# Patient Record
Sex: MALE | Race: WHITE | Employment: FULL TIME | URBAN - METROPOLITAN AREA
[De-identification: names, ages, dates, MRNs, and addresses within clinical notes are randomized per-mention and may not be internally consistent; named-entity substitution may affect disease eponyms.]

---

## 2018-03-20 ENCOUNTER — APPOINTMENT (OUTPATIENT)
Dept: GENERAL RADIOLOGY | Age: 58
End: 2018-03-20
Payer: COMMERCIAL

## 2018-03-20 ENCOUNTER — HOSPITAL ENCOUNTER (EMERGENCY)
Age: 58
Discharge: HOME | End: 2018-03-20
Attending: EMERGENCY MEDICINE
Payer: COMMERCIAL

## 2018-03-20 VITALS
HEART RATE: 114 BPM | TEMPERATURE: 98.9 F | HEIGHT: 75 IN | RESPIRATION RATE: 16 BRPM | OXYGEN SATURATION: 96 % | SYSTOLIC BLOOD PRESSURE: 149 MMHG | BODY MASS INDEX: 24.25 KG/M2 | DIASTOLIC BLOOD PRESSURE: 77 MMHG | WEIGHT: 195 LBS

## 2018-03-20 DIAGNOSIS — K59.1 FUNCTIONAL DIARRHEA: Primary | ICD-10-CM

## 2018-03-20 DIAGNOSIS — R50.81 FEVER IN OTHER DISEASES: ICD-10-CM

## 2018-03-20 LAB
INFLUENZA A BY PCR: NOT DETECTED
INFLUENZA B BY PCR: NOT DETECTED

## 2018-03-20 PROCEDURE — 6370000000 HC RX 637 (ALT 250 FOR IP): Performed by: EMERGENCY MEDICINE

## 2018-03-20 PROCEDURE — 99284 EMERGENCY DEPT VISIT MOD MDM: CPT

## 2018-03-20 PROCEDURE — 71046 X-RAY EXAM CHEST 2 VIEWS: CPT

## 2018-03-20 PROCEDURE — 87502 INFLUENZA DNA AMP PROBE: CPT

## 2018-03-20 RX ORDER — IBUPROFEN 800 MG/1
800 TABLET ORAL ONCE
Status: COMPLETED | OUTPATIENT
Start: 2018-03-20 | End: 2018-03-20

## 2018-03-20 RX ADMIN — IBUPROFEN 800 MG: 800 TABLET, FILM COATED ORAL at 04:13

## 2018-03-20 ASSESSMENT — PAIN SCALES - GENERAL
PAINLEVEL_OUTOF10: 3
PAINLEVEL_OUTOF10: 3

## 2018-03-20 ASSESSMENT — PAIN DESCRIPTION - PAIN TYPE: TYPE: ACUTE PAIN

## 2018-03-20 ASSESSMENT — PAIN DESCRIPTION - LOCATION: LOCATION: HEAD

## 2018-03-20 NOTE — ED PROVIDER NOTES
Distension:  None. Tenderness: No abdominal tenderness. Liver: non-tender. Spleen:  non-palpable and non-tender. Pulsatile Mass: absent. Hernia:  no inguinal or femoral hernias noted. Integument:  Normal turgor. Warm, dry, without visible rash, unless noted elsewhere. Lymphatic: no lymphadenopathy noted  Neurological:  Oriented. Motor functions intact. Lab / Imaging Results   (All laboratory and radiology results have been personally reviewed by myself)  Labs:  Results for orders placed or performed during the hospital encounter of 03/20/18   Rapid influenza A/B antigens   Result Value Ref Range    Influenza A by PCR Not Detected Not Detected    Influenza B by PCR Not Detected Not Detected     Imaging: All Radiology results interpreted by Radiologist unless otherwise noted. XR CHEST STANDARD (2 VW)    (Results Pending)     ED Course / Medical Decision Making     Medications   ibuprofen (ADVIL;MOTRIN) tablet 800 mg (800 mg Oral Given 3/20/18 0413)     ED Course      Re-examination:  3/20/18             MDM:     Patient presents with diarrhea fever or sinus congestion and postnasal drip. He is well-appearing nontoxic afebrile here. Mild frontal sinus tenderness he started taking a decongestant with improvement yesterday. Lungs are clear pulse ox is normal no abdominal tenderness guarding rebound rigidity chest x-ray and influenza are negative. Chest x-ray interpreted by myself without radiology confirmation to be nonacute. Patient with constellation of findings consistent with sinus congestion diarrhea fever and body aches. I do not believe he has a true sinusitis. Continue taking the Mucinex Tylenol Motrin for fever close PCP follow-up and discuss reasons for ED return sooner  Counseling:     The emergency provider has spoken with the patient and discussed todays results, in addition to providing specific details for the plan of care and counseling

## 2019-04-02 ENCOUNTER — HOSPITAL ENCOUNTER (OUTPATIENT)
Age: 59
Discharge: HOME | End: 2019-04-04
Payer: COMMERCIAL

## 2019-04-02 LAB — PROSTATE SPECIFIC ANTIGEN: 1.91 NG/ML (ref 0–4)

## 2019-04-02 PROCEDURE — G0103 PSA SCREENING: HCPCS

## 2020-01-08 ENCOUNTER — HOSPITAL ENCOUNTER (OUTPATIENT)
Age: 60
Discharge: HOME | End: 2020-01-10

## 2020-01-08 PROCEDURE — 88305 TISSUE EXAM BY PATHOLOGIST: CPT

## 2020-01-09 ENCOUNTER — HOSPITAL ENCOUNTER (OUTPATIENT)
Age: 60
Discharge: HOME | End: 2020-01-11

## 2020-01-09 LAB
ANION GAP SERPL CALCULATED.3IONS-SCNC: 12 MMOL/L (ref 7–16)
BUN BLDV-MCNC: 8 MG/DL (ref 8–23)
CALCIUM SERPL-MCNC: 8.6 MG/DL (ref 8.6–10.2)
CHLORIDE BLD-SCNC: 103 MMOL/L (ref 98–107)
CO2: 22 MMOL/L (ref 22–29)
CREAT SERPL-MCNC: 0.9 MG/DL (ref 0.7–1.2)
GFR AFRICAN AMERICAN: >60
GFR NON-AFRICAN AMERICAN: >60 ML/MIN/1.73
GLUCOSE BLD-MCNC: 100 MG/DL (ref 74–99)
HCT VFR BLD CALC: 41.3 % (ref 37–54)
HEMOGLOBIN: 13.4 G/DL (ref 12.5–16.5)
POTASSIUM SERPL-SCNC: 3.6 MMOL/L (ref 3.5–5)
SODIUM BLD-SCNC: 137 MMOL/L (ref 132–146)

## 2020-01-09 PROCEDURE — 85018 HEMOGLOBIN: CPT

## 2020-01-09 PROCEDURE — 85014 HEMATOCRIT: CPT

## 2020-01-09 PROCEDURE — 80048 BASIC METABOLIC PNL TOTAL CA: CPT

## 2020-05-26 ENCOUNTER — HOSPITAL ENCOUNTER (OUTPATIENT)
Age: 60
Discharge: HOME | End: 2020-05-28
Payer: COMMERCIAL

## 2020-05-26 LAB — PROSTATE SPECIFIC ANTIGEN: 0.9 NG/ML (ref 0–4)

## 2020-05-26 PROCEDURE — 84153 ASSAY OF PSA TOTAL: CPT

## 2020-09-01 ENCOUNTER — HOSPITAL ENCOUNTER (OUTPATIENT)
Age: 60
Discharge: HOME | End: 2020-09-03
Payer: COMMERCIAL

## 2020-09-01 LAB — PROSTATE SPECIFIC ANTIGEN: 1.04 NG/ML (ref 0–4)

## 2020-09-01 PROCEDURE — 84153 ASSAY OF PSA TOTAL: CPT

## 2022-01-17 ENCOUNTER — HOSPITAL ENCOUNTER (OUTPATIENT)
Age: 62
Discharge: HOME | End: 2022-01-19

## 2022-01-17 PROCEDURE — 88305 TISSUE EXAM BY PATHOLOGIST: CPT

## 2023-05-16 ENCOUNTER — HOSPITAL ENCOUNTER (OUTPATIENT)
Age: 63
Discharge: HOME | End: 2023-05-18
Payer: COMMERCIAL

## 2023-05-16 ENCOUNTER — HOSPITAL ENCOUNTER (OUTPATIENT)
Dept: GENERAL RADIOLOGY | Age: 63
Discharge: HOME | End: 2023-05-18
Payer: COMMERCIAL

## 2023-05-16 DIAGNOSIS — M25.561 RIGHT KNEE PAIN, UNSPECIFIED CHRONICITY: ICD-10-CM

## 2023-05-16 DIAGNOSIS — M25.562 LEFT KNEE PAIN, UNSPECIFIED CHRONICITY: ICD-10-CM

## 2023-05-16 PROCEDURE — 73564 X-RAY EXAM KNEE 4 OR MORE: CPT

## 2023-07-14 ENCOUNTER — OFFICE VISIT (OUTPATIENT)
Dept: NEUROSURGERY | Age: 63
End: 2023-07-14
Payer: COMMERCIAL

## 2023-07-14 VITALS
OXYGEN SATURATION: 95 % | BODY MASS INDEX: 23.62 KG/M2 | HEIGHT: 75 IN | HEART RATE: 103 BPM | SYSTOLIC BLOOD PRESSURE: 130 MMHG | DIASTOLIC BLOOD PRESSURE: 85 MMHG | WEIGHT: 190 LBS

## 2023-07-14 DIAGNOSIS — M62.81 MUSCLE WEAKNESS: Primary | ICD-10-CM

## 2023-07-14 PROCEDURE — 99204 OFFICE O/P NEW MOD 45 MIN: CPT | Performed by: NEUROLOGICAL SURGERY

## 2023-07-14 PROCEDURE — 99202 OFFICE O/P NEW SF 15 MIN: CPT | Performed by: NEUROLOGICAL SURGERY

## 2023-07-14 RX ORDER — BUPROPION HYDROCHLORIDE 300 MG/1
TABLET ORAL
COMMUNITY
Start: 2023-06-02

## 2023-07-14 RX ORDER — FLUOXETINE HYDROCHLORIDE 20 MG/1
CAPSULE ORAL
COMMUNITY
Start: 2023-06-13

## 2023-07-14 ASSESSMENT — ENCOUNTER SYMPTOMS
EYES NEGATIVE: 1
BACK PAIN: 1
ALLERGIC/IMMUNOLOGIC NEGATIVE: 1
GASTROINTESTINAL NEGATIVE: 1
RESPIRATORY NEGATIVE: 1

## 2023-07-14 NOTE — PROGRESS NOTES
Mouth/Throat:      Mouth: Mucous membranes are moist.      Pharynx: Oropharynx is clear. No oropharyngeal exudate. Eyes:      General: No visual field deficit or scleral icterus. Right eye: No discharge. Left eye: No discharge. Conjunctiva/sclera: Conjunctivae normal.      Pupils: Pupils are equal, round, and reactive to light. Pulmonary:      Effort: Pulmonary effort is normal.   Abdominal:      General: Abdomen is flat. There is no distension. Musculoskeletal:         General: No swelling, tenderness, deformity or signs of injury. Normal range of motion. Right lower leg: No edema. Left lower leg: No edema. Skin:     General: Skin is warm and dry. Capillary Refill: Capillary refill takes less than 2 seconds. Coloration: Skin is not jaundiced or pale. Findings: No bruising, erythema, lesion or rash. Neurological:      General: No focal deficit present. Mental Status: He is alert and oriented to person, place, and time. Mental status is at baseline. GCS: GCS eye subscore is 4. GCS verbal subscore is 5. GCS motor subscore is 6. Cranial Nerves: No cranial nerve deficit, dysarthria or facial asymmetry. Sensory: Sensation is intact. No sensory deficit. Motor: Motor function is intact. No weakness, tremor, atrophy, abnormal muscle tone, seizure activity or pronator drift. Coordination: Romberg sign negative. Coordination normal. Finger-Nose-Finger Test and Heel to [facility] Test normal.      Gait: Gait normal.      Deep Tendon Reflexes: Reflexes normal. Babinski sign absent on the right side. Babinski sign absent on the left side. Reflex Scores:       Tricep reflexes are 2+ on the right side and 2+ on the left side. Bicep reflexes are 2+ on the right side and 2+ on the left side. Brachioradialis reflexes are 2+ on the right side and 2+ on the left side.        Patellar reflexes are 2+ on the right side and 2+ on the left

## 2023-08-01 ENCOUNTER — HOSPITAL ENCOUNTER (OUTPATIENT)
Dept: NEUROLOGY | Age: 63
Discharge: HOME | End: 2023-08-01
Payer: COMMERCIAL

## 2023-08-01 VITALS — BODY MASS INDEX: 23.62 KG/M2 | WEIGHT: 190 LBS | HEIGHT: 75 IN

## 2023-08-01 PROCEDURE — 95913 NRV CNDJ TEST 13/> STUDIES: CPT | Performed by: PHYSICAL MEDICINE & REHABILITATION

## 2023-08-01 PROCEDURE — 95885 MUSC TST DONE W/NERV TST LIM: CPT

## 2023-08-01 PROCEDURE — 95913 NRV CNDJ TEST 13/> STUDIES: CPT

## 2023-08-01 PROCEDURE — 95885 MUSC TST DONE W/NERV TST LIM: CPT | Performed by: PHYSICAL MEDICINE & REHABILITATION

## 2023-08-01 NOTE — PROCEDURES
bilaterally (cutoff for normal is a combined latency prolongation of 0.9 ms). This test increases sensitivity of detection of median nerve mononeuropathy. Motor nerve conduction studies of the bilateral median, ulnar, peroneal, and tibial nerves showed normal distal latencies, normal CMAP amplitudes, and normal conduction velocities. F-wave studies of the bilateral median and ulnar nerves revealed normal latencies. F-wave studies of the bilateral common peroneal and tibial nerves revealed delayed latencies. Bilateral tibial nerve H-reflex responses were normal.      Needle EMG:   Needle EMG was performed using a monopolar needle. All muscles tested, as listed in the table above, demonstrated normal amplitude, duration, phases, and recruitment. There was no evidence of active denervation. Needle exam was very limited / incomplete due to patient severe anxiety. Patient requested to pause test during needle exam and test was not resumed. Diagnostic Interpretation: This study was incomplete due to incomplete needle exam.     There is no definitive electrodiagnostic evidence of any peripheral nerve mononeuropathy, plexopathy, or peripheral polyneuropathy in the upper or lower extremities. Cannot evaluate for cervical or lumbar motor radiculopathy without completion of more extensive needle exam. Prolonged lower extremity F-waves were noted, which is a non-specific finding; possible causes may include an early polyneuropathy, lumbar radiculopathies, or may be a technical finding related to patient's height. Lower extremity needle exam was not completed to further define due to patient's severe anxiety during exam. Clinical correlation is advised. Previous Study: There is no prior study for comparison    Follow up EMG can be completed if clinically indicated.      Technologist: Mirella Broderick    Physician:  Jayne Schmidt MD  Physical Medicine and Rehabilitation     Nerve conduction studies and

## 2023-12-07 ENCOUNTER — HOSPITAL ENCOUNTER (OUTPATIENT)
Dept: ULTRASOUND IMAGING | Age: 63
Discharge: HOME | End: 2023-12-09
Payer: MEDICARE

## 2023-12-07 DIAGNOSIS — Z13.6 ENCOUNTER FOR SCREENING FOR CARDIOVASCULAR DISORDERS: ICD-10-CM

## 2023-12-07 PROCEDURE — 76706 US ABDL AORTA SCREEN AAA: CPT

## 2024-10-05 ENCOUNTER — HOSPITAL ENCOUNTER (EMERGENCY)
Age: 64
Discharge: HOME | End: 2024-10-05
Attending: EMERGENCY MEDICINE
Payer: MEDICARE

## 2024-10-05 ENCOUNTER — APPOINTMENT (OUTPATIENT)
Dept: GENERAL RADIOLOGY | Age: 64
End: 2024-10-05
Payer: MEDICARE

## 2024-10-05 ENCOUNTER — APPOINTMENT (OUTPATIENT)
Dept: CT IMAGING | Age: 64
End: 2024-10-05
Payer: MEDICARE

## 2024-10-05 VITALS
DIASTOLIC BLOOD PRESSURE: 80 MMHG | BODY MASS INDEX: 23.62 KG/M2 | OXYGEN SATURATION: 91 % | TEMPERATURE: 99 F | SYSTOLIC BLOOD PRESSURE: 124 MMHG | WEIGHT: 190 LBS | HEART RATE: 111 BPM | RESPIRATION RATE: 18 BRPM | HEIGHT: 75 IN

## 2024-10-05 DIAGNOSIS — U07.1 COVID-19: Primary | ICD-10-CM

## 2024-10-05 DIAGNOSIS — R42 LIGHTHEADEDNESS: ICD-10-CM

## 2024-10-05 DIAGNOSIS — J18.9 PNEUMONIA DUE TO INFECTIOUS ORGANISM, UNSPECIFIED LATERALITY, UNSPECIFIED PART OF LUNG: ICD-10-CM

## 2024-10-05 LAB
ALBUMIN SERPL-MCNC: 4.4 G/DL (ref 3.5–5.2)
ALP SERPL-CCNC: 109 U/L (ref 40–129)
ALT SERPL-CCNC: 29 U/L (ref 0–40)
ANION GAP SERPL CALCULATED.3IONS-SCNC: 10 MMOL/L (ref 7–16)
AST SERPL-CCNC: 29 U/L (ref 0–39)
BASOPHILS # BLD: 0 K/UL (ref 0–0.2)
BASOPHILS NFR BLD: 0 % (ref 0–2)
BILIRUB SERPL-MCNC: 0.3 MG/DL (ref 0–1.2)
BNP SERPL-MCNC: <36 PG/ML (ref 0–125)
BUN SERPL-MCNC: 12 MG/DL (ref 6–23)
CALCIUM SERPL-MCNC: 8.9 MG/DL (ref 8.6–10.2)
CHLORIDE SERPL-SCNC: 102 MMOL/L (ref 98–107)
CO2 SERPL-SCNC: 21 MMOL/L (ref 22–29)
CREAT SERPL-MCNC: 0.9 MG/DL (ref 0.7–1.2)
EOSINOPHIL # BLD: 0.13 K/UL (ref 0.05–0.5)
EOSINOPHILS RELATIVE PERCENT: 2 % (ref 0–6)
ERYTHROCYTE [DISTWIDTH] IN BLOOD BY AUTOMATED COUNT: 12.8 % (ref 11.5–15)
GFR, ESTIMATED: >90 ML/MIN/1.73M2
GLUCOSE SERPL-MCNC: 121 MG/DL (ref 74–99)
HCT VFR BLD AUTO: 47.1 % (ref 37–54)
HGB BLD-MCNC: 16 G/DL (ref 12.5–16.5)
LYMPHOCYTES NFR BLD: 0.44 K/UL (ref 1.5–4)
LYMPHOCYTES RELATIVE PERCENT: 6 % (ref 20–42)
MCH RBC QN AUTO: 33.1 PG (ref 26–35)
MCHC RBC AUTO-ENTMCNC: 34 G/DL (ref 32–34.5)
MCV RBC AUTO: 97.5 FL (ref 80–99.9)
MONOCYTES NFR BLD: 1.71 K/UL (ref 0.1–0.95)
MONOCYTES NFR BLD: 24 % (ref 2–12)
NEUTROPHILS NFR BLD: 68 % (ref 43–80)
NEUTS SEG NFR BLD: 4.95 K/UL (ref 1.8–7.3)
PLATELET # BLD AUTO: 245 K/UL (ref 130–450)
PMV BLD AUTO: 8.4 FL (ref 7–12)
POTASSIUM SERPL-SCNC: 4 MMOL/L (ref 3.5–5)
PROMYELOCYTES ABSOLUTE COUNT: 0.06 K/UL
PROMYELOCYTES: 1 %
PROT SERPL-MCNC: 7 G/DL (ref 6.4–8.3)
RBC # BLD AUTO: 4.83 M/UL (ref 3.8–5.8)
RBC # BLD: NORMAL 10*6/UL
SODIUM SERPL-SCNC: 133 MMOL/L (ref 132–146)
TROPONIN I SERPL HS-MCNC: 11 NG/L (ref 0–11)
TROPONIN I SERPL HS-MCNC: 12 NG/L (ref 0–11)
WBC OTHER # BLD: 7.3 K/UL (ref 4.5–11.5)

## 2024-10-05 PROCEDURE — 84484 ASSAY OF TROPONIN QUANT: CPT

## 2024-10-05 PROCEDURE — 96374 THER/PROPH/DIAG INJ IV PUSH: CPT

## 2024-10-05 PROCEDURE — 71275 CT ANGIOGRAPHY CHEST: CPT

## 2024-10-05 PROCEDURE — 93005 ELECTROCARDIOGRAM TRACING: CPT | Performed by: STUDENT IN AN ORGANIZED HEALTH CARE EDUCATION/TRAINING PROGRAM

## 2024-10-05 PROCEDURE — 94640 AIRWAY INHALATION TREATMENT: CPT

## 2024-10-05 PROCEDURE — 6360000002 HC RX W HCPCS: Performed by: EMERGENCY MEDICINE

## 2024-10-05 PROCEDURE — 94664 DEMO&/EVAL PT USE INHALER: CPT

## 2024-10-05 PROCEDURE — 99285 EMERGENCY DEPT VISIT HI MDM: CPT

## 2024-10-05 PROCEDURE — 85025 COMPLETE CBC W/AUTO DIFF WBC: CPT

## 2024-10-05 PROCEDURE — 80053 COMPREHEN METABOLIC PANEL: CPT

## 2024-10-05 PROCEDURE — 6370000000 HC RX 637 (ALT 250 FOR IP): Performed by: EMERGENCY MEDICINE

## 2024-10-05 PROCEDURE — 6360000004 HC RX CONTRAST MEDICATION: Performed by: RADIOLOGY

## 2024-10-05 PROCEDURE — 83880 ASSAY OF NATRIURETIC PEPTIDE: CPT

## 2024-10-05 RX ORDER — DOXYCYCLINE HYCLATE 100 MG
100 TABLET ORAL 2 TIMES DAILY
Qty: 20 TABLET | Refills: 0 | Status: SHIPPED | OUTPATIENT
Start: 2024-10-05 | End: 2024-10-15

## 2024-10-05 RX ORDER — CEFDINIR 300 MG/1
300 CAPSULE ORAL ONCE
Status: COMPLETED | OUTPATIENT
Start: 2024-10-05 | End: 2024-10-05

## 2024-10-05 RX ORDER — DOXYCYCLINE 100 MG/1
100 CAPSULE ORAL ONCE
Status: COMPLETED | OUTPATIENT
Start: 2024-10-05 | End: 2024-10-05

## 2024-10-05 RX ORDER — PREDNISONE 50 MG/1
50 TABLET ORAL DAILY
Qty: 5 TABLET | Refills: 0 | Status: SHIPPED | OUTPATIENT
Start: 2024-10-05 | End: 2024-10-10

## 2024-10-05 RX ORDER — ALBUTEROL SULFATE 90 UG/1
2 INHALANT RESPIRATORY (INHALATION) 4 TIMES DAILY PRN
Qty: 18 G | Refills: 0 | Status: SHIPPED | OUTPATIENT
Start: 2024-10-05

## 2024-10-05 RX ORDER — IOPAMIDOL 755 MG/ML
75 INJECTION, SOLUTION INTRAVASCULAR
Status: COMPLETED | OUTPATIENT
Start: 2024-10-05 | End: 2024-10-05

## 2024-10-05 RX ORDER — IPRATROPIUM BROMIDE AND ALBUTEROL SULFATE 2.5; .5 MG/3ML; MG/3ML
1 SOLUTION RESPIRATORY (INHALATION)
Status: COMPLETED | OUTPATIENT
Start: 2024-10-05 | End: 2024-10-05

## 2024-10-05 RX ORDER — DEXAMETHASONE SODIUM PHOSPHATE 10 MG/ML
10 INJECTION INTRAMUSCULAR; INTRAVENOUS ONCE
Status: COMPLETED | OUTPATIENT
Start: 2024-10-05 | End: 2024-10-05

## 2024-10-05 RX ORDER — CEFDINIR 300 MG/1
300 CAPSULE ORAL 2 TIMES DAILY
Qty: 20 CAPSULE | Refills: 0 | Status: SHIPPED | OUTPATIENT
Start: 2024-10-05 | End: 2024-10-15

## 2024-10-05 RX ADMIN — IPRATROPIUM BROMIDE AND ALBUTEROL SULFATE 1 DOSE: 2.5; .5 SOLUTION RESPIRATORY (INHALATION) at 11:20

## 2024-10-05 RX ADMIN — IPRATROPIUM BROMIDE AND ALBUTEROL SULFATE 1 DOSE: 2.5; .5 SOLUTION RESPIRATORY (INHALATION) at 11:10

## 2024-10-05 RX ADMIN — IPRATROPIUM BROMIDE AND ALBUTEROL SULFATE 1 DOSE: 2.5; .5 SOLUTION RESPIRATORY (INHALATION) at 11:09

## 2024-10-05 RX ADMIN — CEFDINIR 300 MG: 300 CAPSULE ORAL at 12:45

## 2024-10-05 RX ADMIN — DEXAMETHASONE SODIUM PHOSPHATE 10 MG: 10 INJECTION INTRAMUSCULAR; INTRAVENOUS at 11:57

## 2024-10-05 RX ADMIN — IOPAMIDOL 75 ML: 755 INJECTION, SOLUTION INTRAVENOUS at 10:45

## 2024-10-05 RX ADMIN — DOXYCYCLINE HYCLATE 100 MG: 100 CAPSULE ORAL at 12:45

## 2024-10-05 ASSESSMENT — PAIN SCALES - GENERAL: PAINLEVEL_OUTOF10: 4

## 2024-10-05 ASSESSMENT — PAIN DESCRIPTION - LOCATION: LOCATION: HEAD

## 2024-10-05 ASSESSMENT — LIFESTYLE VARIABLES
HOW MANY STANDARD DRINKS CONTAINING ALCOHOL DO YOU HAVE ON A TYPICAL DAY: PATIENT DOES NOT DRINK
HOW OFTEN DO YOU HAVE A DRINK CONTAINING ALCOHOL: NEVER

## 2024-10-05 ASSESSMENT — PAIN - FUNCTIONAL ASSESSMENT: PAIN_FUNCTIONAL_ASSESSMENT: 0-10

## 2024-10-05 NOTE — ED PROVIDER NOTES
Name: Attila Mcdonald    MRN: [id number]     Date / Time Roomed:  10/5/2024  9:12 AM  ED Bed Assignment:  12/12    ------------------ History of Present Illness --------------------  10/5/24, Time: 9:21 AM EDT   Chief Complaint   Patient presents with    Shortness of Breath      HPI    Attila Mcdonald is a 64 y.o. male, with hx of hyperlipidemia, autonomic dysfunction, who presents to the ED today for cough, congestion, lightheadedness, near syncope this morning.  He states he has felt under the weather with cough and congestion over the past 3 days, he states his wife had COVID earlier this week, he states he had a COVID test this morning which was positive.  He states while getting his test he became lightheaded and fell like he was going to pass out.  He does admit that he has had some decreased appetite and probably not drinking enough over the past several days.  He states he also recently traveled to [place] last week which was an 8-hour drive each way.  He denies any leg swelling or pain.  Denies any history of blood clots.  He is not on blood thinners.  He denies any chest pain or abdominal pain.  He did have some nausea this morning however no vomiting.  He does have history of anxiety.  He also relates history of autonomic dysfunction and has seen [facility] for this.  The pt denies other ROS at this time.     PCP: Brian Mak MD.    -------------------- [facility] --------------------    Past Medical History:   has a past medical history of Hyperlipidemia.     Surgical History:  Past Surgical History:   Procedure Laterality Date    COLONOSCOPY         Social History:  Social History     Tobacco Use    Smoking status: Never    Smokeless tobacco: Never   Substance Use Topics    Alcohol use: Yes     Comment: 4 beers a day    Drug use: No       Family History:  Family History   Problem Relation Age of Onset    Other Father         alzheimer's       Allergies:  Patient has no known allergies.    The    Musculoskeletal:         General: No tenderness or deformity. Normal range of motion.      Cervical back: Normal range of motion. No rigidity or tenderness.      Right lower leg: No tenderness. No edema.      Left lower leg: No tenderness. No edema.   Skin:     General: Skin is warm and dry.      Capillary Refill: Capillary refill takes less than 2 seconds.      Coloration: Skin is not jaundiced or pale.      Findings: No bruising, erythema, lesion or rash.   Neurological:      General: No focal deficit present.      Mental Status: He is alert and oriented to person, place, and time.      Cranial Nerves: No cranial nerve deficit.      Motor: No weakness.   Psychiatric:         Mood and Affect: Mood is anxious.         Behavior: Behavior normal.          --------------- External Imaging -------------    -------------------- Procedures --------------------    -------------------- MDM --------------------    /80   Pulse (!) 111   Temp 99 °F (37.2 °C) (Oral)   Resp 18   Ht 1.905 m (6' 3\")   Wt 86.2 kg (190 lb)   SpO2 91%   BMI 23.75 kg/m²       Diagnoses considered, but not limited to, include anxiety, dehydration, electrolyte arrangement, pulmonary emboli, ACS, arrhythmia, to name a few...     Labwork ordered and interpretation by myself. Details below.   Imaging ordered and interpretations by myself and radiologist. Details below.    Labs Reviewed   CBC WITH AUTO DIFFERENTIAL - Abnormal; Notable for the following components:       Result Value    Lymphocytes % 6 (*)     Monocytes % 24 (*)     Promyelocytes 1 (*)     Lymphocytes Absolute 0.44 (*)     Monocytes Absolute 1.71 (*)     Promyelocytes Absolute 0.06 (*)     All other components within normal limits   COMPREHENSIVE METABOLIC PANEL W/ REFLEX TO MG FOR LOW K - Abnormal; Notable for the following components:    CO2 21 (*)     Glucose 121 (*)     All other components within normal limits   TROPONIN - Abnormal; Notable for the following components:

## 2024-10-05 NOTE — DISCHARGE INSTRUCTIONS
Call family doctor tomorrow and schedule a followup appointment to be seen next week.     Please stay well-hydrated.  Tylenol or Motrin for any fever or discomfort, please follow directions on bottle.    Have your doctor obtain  finalized report of CT scan today    CTA PULMONARY W CONTRAST   Final Result   1. There is no pulmonary embolus   2. Small patchy opacity within the superior segment right lower lobe which   could represent pneumonia, viral pneumonia or and or possibly atelectasis.   3. The left lung is clear..

## 2024-10-08 LAB
EKG ATRIAL RATE: 91 BPM
EKG P AXIS: 55 DEGREES
EKG P-R INTERVAL: 170 MS
EKG Q-T INTERVAL: 362 MS
EKG QRS DURATION: 94 MS
EKG QTC CALCULATION (BAZETT): 445 MS
EKG R AXIS: -69 DEGREES
EKG T AXIS: 77 DEGREES
EKG VENTRICULAR RATE: 91 BPM

## 2024-10-08 PROCEDURE — 93010 ELECTROCARDIOGRAM REPORT: CPT | Performed by: INTERNAL MEDICINE

## 2024-10-18 ENCOUNTER — HOSPITAL ENCOUNTER (OUTPATIENT)
Dept: GENERAL RADIOLOGY | Age: 64
Discharge: HOME | End: 2024-10-20
Attending: FAMILY MEDICINE
Payer: MEDICARE

## 2024-10-18 ENCOUNTER — HOSPITAL ENCOUNTER (OUTPATIENT)
Age: 64
Discharge: HOME | End: 2024-10-20
Payer: MEDICARE

## 2024-10-18 DIAGNOSIS — J15.9 UNSPECIFIED BACTERIAL PNEUMONIA: ICD-10-CM

## 2024-10-18 PROCEDURE — 71046 X-RAY EXAM CHEST 2 VIEWS: CPT

## 2024-11-10 ENCOUNTER — APPOINTMENT (OUTPATIENT)
Dept: GENERAL RADIOLOGY | Age: 64
End: 2024-11-10
Payer: MEDICARE

## 2024-11-10 ENCOUNTER — HOSPITAL ENCOUNTER (EMERGENCY)
Age: 64
Discharge: HOME | End: 2024-11-10
Attending: STUDENT IN AN ORGANIZED HEALTH CARE EDUCATION/TRAINING PROGRAM
Payer: MEDICARE

## 2024-11-10 VITALS
HEIGHT: 75 IN | OXYGEN SATURATION: 100 % | HEART RATE: 69 BPM | BODY MASS INDEX: 23.62 KG/M2 | WEIGHT: 190 LBS | SYSTOLIC BLOOD PRESSURE: 128 MMHG | TEMPERATURE: 96.8 F | DIASTOLIC BLOOD PRESSURE: 85 MMHG | RESPIRATION RATE: 17 BRPM

## 2024-11-10 DIAGNOSIS — S61.411A LACERATION OF RIGHT HAND WITHOUT FOREIGN BODY, INITIAL ENCOUNTER: Primary | ICD-10-CM

## 2024-11-10 PROCEDURE — 6370000000 HC RX 637 (ALT 250 FOR IP): Performed by: NURSE PRACTITIONER

## 2024-11-10 PROCEDURE — 6370000000 HC RX 637 (ALT 250 FOR IP)

## 2024-11-10 PROCEDURE — 73110 X-RAY EXAM OF WRIST: CPT

## 2024-11-10 PROCEDURE — 2500000003 HC RX 250 WO HCPCS: Performed by: NURSE PRACTITIONER

## 2024-11-10 PROCEDURE — 12002 RPR S/N/AX/GEN/TRNK2.6-7.5CM: CPT

## 2024-11-10 PROCEDURE — 96374 THER/PROPH/DIAG INJ IV PUSH: CPT

## 2024-11-10 PROCEDURE — 90471 IMMUNIZATION ADMIN: CPT | Performed by: NURSE PRACTITIONER

## 2024-11-10 PROCEDURE — 99284 EMERGENCY DEPT VISIT MOD MDM: CPT

## 2024-11-10 PROCEDURE — 73130 X-RAY EXAM OF HAND: CPT

## 2024-11-10 PROCEDURE — 2580000003 HC RX 258: Performed by: NURSE PRACTITIONER

## 2024-11-10 PROCEDURE — 6360000002 HC RX W HCPCS: Performed by: NURSE PRACTITIONER

## 2024-11-10 PROCEDURE — 90714 TD VACC NO PRESV 7 YRS+ IM: CPT | Performed by: NURSE PRACTITIONER

## 2024-11-10 RX ORDER — BACITRACIN ZINC 500 [USP'U]/G
OINTMENT TOPICAL ONCE
Status: COMPLETED | OUTPATIENT
Start: 2024-11-10 | End: 2024-11-10

## 2024-11-10 RX ORDER — LIDOCAINE HYDROCHLORIDE 10 MG/ML
5 INJECTION, SOLUTION INFILTRATION; PERINEURAL ONCE
Status: COMPLETED | OUTPATIENT
Start: 2024-11-10 | End: 2024-11-10

## 2024-11-10 RX ORDER — CEPHALEXIN 500 MG/1
500 CAPSULE ORAL 4 TIMES DAILY
Qty: 40 CAPSULE | Refills: 0 | Status: SHIPPED | OUTPATIENT
Start: 2024-11-10 | End: 2024-11-20

## 2024-11-10 RX ORDER — HYDROCODONE BITARTRATE AND ACETAMINOPHEN 5; 325 MG/1; MG/1
1 TABLET ORAL ONCE
Status: COMPLETED | OUTPATIENT
Start: 2024-11-10 | End: 2024-11-10

## 2024-11-10 RX ORDER — BACITRACIN ZINC 500 [USP'U]/G
OINTMENT TOPICAL
Status: COMPLETED
Start: 2024-11-10 | End: 2024-11-10

## 2024-11-10 RX ORDER — HYDROCODONE BITARTRATE AND ACETAMINOPHEN 5; 325 MG/1; MG/1
1 TABLET ORAL EVERY 8 HOURS PRN
Qty: 6 TABLET | Refills: 0 | Status: SHIPPED | OUTPATIENT
Start: 2024-11-10 | End: 2024-11-12

## 2024-11-10 RX ADMIN — CLOSTRIDIUM TETANI TOXOID ANTIGEN (FORMALDEHYDE INACTIVATED) AND CORYNEBACTERIUM DIPHTHERIAE TOXOID ANTIGEN (FORMALDEHYDE INACTIVATED) 0.5 ML: 5; 2 INJECTION, SUSPENSION INTRAMUSCULAR at 12:49

## 2024-11-10 RX ADMIN — BACITRACIN ZINC: 500 OINTMENT TOPICAL at 12:50

## 2024-11-10 RX ADMIN — LIDOCAINE HYDROCHLORIDE 5 ML: 10 INJECTION, SOLUTION INFILTRATION; PERINEURAL at 12:48

## 2024-11-10 RX ADMIN — CEFAZOLIN 2000 MG: 2 INJECTION, POWDER, FOR SOLUTION INTRAMUSCULAR; INTRAVENOUS at 14:44

## 2024-11-10 RX ADMIN — HYDROCODONE BITARTRATE AND ACETAMINOPHEN 1 TABLET: 5; 325 TABLET ORAL at 12:48

## 2024-11-10 ASSESSMENT — PAIN SCALES - GENERAL: PAINLEVEL_OUTOF10: 8

## 2024-11-10 ASSESSMENT — PAIN DESCRIPTION - LOCATION: LOCATION: HAND

## 2024-11-10 ASSESSMENT — PAIN - FUNCTIONAL ASSESSMENT: PAIN_FUNCTIONAL_ASSESSMENT: 0-10

## 2024-11-10 ASSESSMENT — PAIN DESCRIPTION - ORIENTATION: ORIENTATION: RIGHT

## 2024-11-10 NOTE — ED PROVIDER NOTES
Shared AARON-ED Attending Visit.  CC:No         [facility]  Department of Emergency Medicine   ED  Encounter Note  Admit Date/RoomTime: 11/10/2024 12:41 PM  ED Room:     NAME: Attila Mcdonald  : 1960  MRN: [id number]     Chief Complaint:  Laceration (laceration to top of right hand from wood off table saw, tetanus needs updated, not on thinners)    History of Present Illness       Attila Mcdonald is a 64 y.o. old male presenting to the emergency department by private vehicle, for a laceration to the right hand, caused by a piece of wood that he was cutting that kicked back and hit his hand, which occurred at home approximately 1 hour(s) prior to arrival.  There is  a possibility of retained foreign body in the affected area.     Bleeding is  controlled. He takes no blood thinning agents.  There is pain at injury site.   Tetanus Status:  more than 10 years ago.     ROS   Pertinent positives and negatives are stated within HPI, all other systems reviewed and are negative.    Past Medical History:  has a past medical history of Hyperlipidemia.    Surgical History:  has a past surgical history that includes Colonoscopy.    Social History:  reports that he has never smoked. He has never used smokeless tobacco. He reports current alcohol use. He reports that he does not use drugs.    Family History: family history includes Other in his father.     Allergies: Patient has no known allergies.    Physical Exam  Physical Exam   Oxygen Saturation Interpretation: Normal.        ED Triage Vitals [11/10/24 1152]   BP Systolic BP Percentile Diastolic BP Percentile Temp Temp Source Pulse Respirations SpO2   128/85 -- -- 96.8 °F (36 °C) Temporal 69 17 100 %      Height Weight - Scale         1.905 m (6' 3\") 86.2 kg (190 lb)               Constitutional:  Alert, development consistent with age.  Neck:  Normal ROM.  Supple. Non-tender.  Hand: Right             Tenderness: moderate.              Swelling:

## 2024-11-10 NOTE — DISCHARGE INSTRUCTIONS
XR HAND RIGHT (MIN 3 VIEWS)   Final Result   No acute bony abnormality.         XR WRIST RIGHT (MIN 3 VIEWS)   Final Result   1. No acute fracture or dislocation.   2. If the patient's symptoms persists follow-up studies in 10-14 days may be   helpful to exclude an occult injury.   3.

## 2024-11-12 ENCOUNTER — TELEPHONE (OUTPATIENT)
Dept: ORTHOPEDIC SURGERY | Age: 64
End: 2024-11-12

## 2024-11-12 NOTE — TELEPHONE ENCOUNTER
----- Message from Dr. Louis Yun MD sent at 11/12/2024  8:57 AM EST -----  Needs to see Dr. ANAM Chowdhury, tendon laceration  ----- Message -----  From: Stevie Field DO  Sent: 11/11/2024   3:16 AM EST  To: Louis Yun MD

## 2024-12-06 ENCOUNTER — HOSPITAL ENCOUNTER (OUTPATIENT)
Dept: CT IMAGING | Age: 64
Discharge: HOME | End: 2024-12-08
Payer: MEDICARE

## 2024-12-06 DIAGNOSIS — M17.12 ARTHRITIS OF LEFT KNEE: ICD-10-CM

## 2024-12-06 PROCEDURE — 73700 CT LOWER EXTREMITY W/O DYE: CPT

## 2025-02-06 ENCOUNTER — HOSPITAL ENCOUNTER (OUTPATIENT)
Dept: CT IMAGING | Age: 65
Discharge: HOME | End: 2025-02-08
Payer: MEDICARE

## 2025-02-06 DIAGNOSIS — M17.11 ARTHRITIS OF RIGHT KNEE: ICD-10-CM

## 2025-02-06 PROCEDURE — 73700 CT LOWER EXTREMITY W/O DYE: CPT
